# Patient Record
Sex: FEMALE
[De-identification: names, ages, dates, MRNs, and addresses within clinical notes are randomized per-mention and may not be internally consistent; named-entity substitution may affect disease eponyms.]

---

## 2020-11-05 ENCOUNTER — APPOINTMENT (OUTPATIENT)
Dept: HEART AND VASCULAR | Facility: CLINIC | Age: 35
End: 2020-11-05
Payer: SELF-PAY

## 2020-11-05 VITALS
WEIGHT: 177 LBS | OXYGEN SATURATION: 99 % | SYSTOLIC BLOOD PRESSURE: 120 MMHG | RESPIRATION RATE: 16 BRPM | HEART RATE: 76 BPM | DIASTOLIC BLOOD PRESSURE: 80 MMHG | TEMPERATURE: 97.5 F

## 2020-11-05 DIAGNOSIS — R07.9 CHEST PAIN, UNSPECIFIED: ICD-10-CM

## 2020-11-05 PROBLEM — Z00.00 ENCOUNTER FOR PREVENTIVE HEALTH EXAMINATION: Status: ACTIVE | Noted: 2020-11-05

## 2020-11-05 PROCEDURE — 99204 OFFICE O/P NEW MOD 45 MIN: CPT

## 2020-11-05 PROCEDURE — 93000 ELECTROCARDIOGRAM COMPLETE: CPT

## 2020-11-05 NOTE — REASON FOR VISIT
[Initial Evaluation] : an initial evaluation of [Chest Pain] : chest pain [FreeTextEntry1] : This 35 y.o. woman presents for evaluation of new onset chest pain that began Monday morning and lasted all day. She states the chest pain was sharp and pleuritic.Chest pain was partially reproducible to palpation. There was no exertional component. She denies SOB, palpitations, dizziness or syncope. She went to an ER in [place] where her EKG and troponins were unremarkable. Today she states her chest pain has resolved.

## 2020-11-05 NOTE — PHYSICAL EXAM
[FreeTextEntry1] : HEENT: WNL. Neck: no JVD or bruits. Carotid pulses are present and symmetric. Heart: S1S2 reg w/o murmur, gallop or rub. Lung: CTA. Abdomen: benign. Extremities: no clubbing, cyanosis or edema. +2 distal pulses. Neuro: grossly non-focal.

## 2020-11-05 NOTE — DISCUSSION/SUMMARY
[FreeTextEntry1] : EKG: NSR, normal EKG.\par \par Impression: Atypical non-cardiac chest pain. Doubt  pericarditis given short duration and normal EKG.  Chest pain most likely musculoskeletal in origin. I have reassured the patient. She is to contact me if symptoms recur.  No further cardiac work up indicated at this time.